# Patient Record
Sex: MALE | Race: WHITE | NOT HISPANIC OR LATINO | URBAN - METROPOLITAN AREA
[De-identification: names, ages, dates, MRNs, and addresses within clinical notes are randomized per-mention and may not be internally consistent; named-entity substitution may affect disease eponyms.]

---

## 2023-10-09 ENCOUNTER — OFFICE VISIT (OUTPATIENT)
Dept: URGENT CARE | Facility: CLINIC | Age: 63
End: 2023-10-09
Payer: COMMERCIAL

## 2023-10-09 VITALS
OXYGEN SATURATION: 92 % | TEMPERATURE: 97.7 F | WEIGHT: 157 LBS | SYSTOLIC BLOOD PRESSURE: 174 MMHG | DIASTOLIC BLOOD PRESSURE: 85 MMHG | HEART RATE: 80 BPM | RESPIRATION RATE: 16 BRPM

## 2023-10-09 DIAGNOSIS — R53.83 OTHER FATIGUE: Primary | ICD-10-CM

## 2023-10-09 PROCEDURE — 99213 OFFICE O/P EST LOW 20 MIN: CPT | Performed by: PHYSICIAN ASSISTANT

## 2023-10-09 RX ORDER — METFORMIN HYDROCHLORIDE 500 MG/1
500 TABLET ORAL
COMMUNITY
Start: 2023-09-22 | End: 2024-03-20

## 2023-10-09 RX ORDER — LISINOPRIL 5 MG/1
5 TABLET ORAL DAILY
COMMUNITY

## 2023-10-09 RX ORDER — GLIMEPIRIDE 1 MG/1
1 TABLET ORAL
COMMUNITY
Start: 2023-09-22 | End: 2024-03-20

## 2023-10-09 RX ORDER — ATORVASTATIN CALCIUM 10 MG/1
10 TABLET, FILM COATED ORAL DAILY
COMMUNITY
Start: 2023-09-21 | End: 2024-03-19

## 2023-10-09 ASSESSMENT — PAIN SCALES - GENERAL: PAINLEVEL: 0-NO PAIN

## 2023-10-09 ASSESSMENT — ENCOUNTER SYMPTOMS
WEAKNESS: 1
FATIGUE: 1

## 2023-10-09 NOTE — PROGRESS NOTES
Subjective   Patient ID: Julio Cesar Arboleda is a 63 y.o. male.    Patient is a 63-year-old male who complains of ongoing fatigue that he has been experiencing for the past 2 weeks.  Family member accompanying the patient today states that the patient has demonstrated significant fatigue and loss of energy which she states is completely unexplained.  Patient himself denies specific illness symptoms to include fever, chills, myalgia, congestion, cough or other illness symptoms.  Patient states that he did take a home COVID-19 and the result is negative.  Patient reports that he does not feel that he is physically ill.  Patient denies left chest pain, tightness or pressure reports no episodes of dyspnea or shortness of breath.  Patient states he has no significant history of cardiovascular disease to include congestive heart failure and also denies history of diabetes, anemia or cancer.  Patient reports that he is not experiencing any type of pain symptoms.  Patient states that his appetite is depressed secondary to loss of energy and he reports no dysuria or hematuria.      Fatigue  Associated symptoms: fatigue        The following portions of the chart were reviewed this encounter and updated as appropriate:         Review of Systems   Constitutional:  Positive for fatigue.        Loss of Energy   Neurological:  Positive for weakness.   All other systems reviewed and are negative.    Objective   Physical Exam  Vitals and nursing note reviewed.   Constitutional:       Appearance: Normal appearance. He is normal weight.   HENT:      Head: Normocephalic and atraumatic.      Right Ear: Tympanic membrane, ear canal and external ear normal.      Left Ear: Tympanic membrane, ear canal and external ear normal.      Nose: Nose normal.      Mouth/Throat:      Mouth: Mucous membranes are moist.      Pharynx: Oropharynx is clear.   Eyes:      Extraocular Movements: Extraocular movements intact.      Conjunctiva/sclera: Conjunctivae  normal.      Pupils: Pupils are equal, round, and reactive to light.   Cardiovascular:      Rate and Rhythm: Normal rate and regular rhythm.      Pulses: Normal pulses.      Heart sounds: Normal heart sounds.   Pulmonary:      Effort: Pulmonary effort is normal. No respiratory distress.      Breath sounds: No stridor. No wheezing, rhonchi or rales.   Abdominal:      General: Abdomen is flat.      Palpations: Abdomen is soft.   Musculoskeletal:         General: Normal range of motion.      Cervical back: Normal range of motion and neck supple.      Right lower leg: No edema.      Left lower leg: No edema.   Skin:     General: Skin is warm and dry.      Capillary Refill: Capillary refill takes less than 2 seconds.   Neurological:      General: No focal deficit present.      Mental Status: He is alert and oriented to person, place, and time.   Psychiatric:         Mood and Affect: Mood normal.         Behavior: Behavior normal.         Thought Content: Thought content normal.         Judgment: Judgment normal.       Procedures    Assessment/Plan   Physical exam findings as noted above.  Patient was immediately advised that he requires an extensive medical evaluation to include laboratory testing, EKG, x-ray imaging and possibly CT scan brain to further identify possible causes of his fatigue and weakness symptoms.  Patient was advised that he can contact his primary care physician's office to see if such testing can be performed on an outpatient basis.  Patient was otherwise advised to report to an emergency department if he notes any acute worsening of his symptoms as the above testing and imaging cannot be performed at this urgent care facility.  Patient and his wife verbalize good understanding of the above recommendations.    CLINICAL IMPRESSION:  Fatigue;  Weakness